# Patient Record
Sex: MALE | Race: WHITE | Employment: PART TIME | URBAN - METROPOLITAN AREA
[De-identification: names, ages, dates, MRNs, and addresses within clinical notes are randomized per-mention and may not be internally consistent; named-entity substitution may affect disease eponyms.]

---

## 2021-08-18 ENCOUNTER — APPOINTMENT (OUTPATIENT)
Dept: GENERAL RADIOLOGY | Age: 46
End: 2021-08-18

## 2021-08-18 ENCOUNTER — HOSPITAL ENCOUNTER (EMERGENCY)
Age: 46
Discharge: HOME | End: 2021-08-18

## 2021-08-18 VITALS
RESPIRATION RATE: 18 BRPM | HEART RATE: 60 BPM | DIASTOLIC BLOOD PRESSURE: 71 MMHG | TEMPERATURE: 97.6 F | SYSTOLIC BLOOD PRESSURE: 115 MMHG | OXYGEN SATURATION: 100 %

## 2021-08-18 DIAGNOSIS — S61.215A LACERATION OF LEFT RING FINGER WITHOUT FOREIGN BODY WITHOUT DAMAGE TO NAIL, INITIAL ENCOUNTER: ICD-10-CM

## 2021-08-18 DIAGNOSIS — S62.635A CLOSED DISPLACED FRACTURE OF DISTAL PHALANX OF LEFT RING FINGER, INITIAL ENCOUNTER: Primary | ICD-10-CM

## 2021-08-18 DIAGNOSIS — S67.10XA CRUSH INJURY TO FINGER, INITIAL ENCOUNTER: ICD-10-CM

## 2021-08-18 PROCEDURE — 6360000002 HC RX W HCPCS: Performed by: PHYSICIAN ASSISTANT

## 2021-08-18 PROCEDURE — 6370000000 HC RX 637 (ALT 250 FOR IP): Performed by: PHYSICIAN ASSISTANT

## 2021-08-18 PROCEDURE — 90471 IMMUNIZATION ADMIN: CPT | Performed by: PHYSICIAN ASSISTANT

## 2021-08-18 PROCEDURE — 73140 X-RAY EXAM OF FINGER(S): CPT

## 2021-08-18 PROCEDURE — 90715 TDAP VACCINE 7 YRS/> IM: CPT | Performed by: PHYSICIAN ASSISTANT

## 2021-08-18 PROCEDURE — 29130 APPL FINGER SPLINT STATIC: CPT

## 2021-08-18 PROCEDURE — 99283 EMERGENCY DEPT VISIT LOW MDM: CPT

## 2021-08-18 RX ORDER — NAPROXEN 500 MG/1
500 TABLET ORAL 2 TIMES DAILY PRN
Qty: 20 TABLET | Refills: 0 | Status: SHIPPED | OUTPATIENT
Start: 2021-08-18 | End: 2021-08-28

## 2021-08-18 RX ORDER — CEPHALEXIN 500 MG/1
500 CAPSULE ORAL 4 TIMES DAILY
Qty: 40 CAPSULE | Refills: 0 | Status: SHIPPED | OUTPATIENT
Start: 2021-08-18 | End: 2021-08-28

## 2021-08-18 RX ORDER — NAPROXEN 250 MG/1
500 TABLET ORAL ONCE
Status: COMPLETED | OUTPATIENT
Start: 2021-08-18 | End: 2021-08-18

## 2021-08-18 RX ORDER — CEPHALEXIN 250 MG/1
500 CAPSULE ORAL ONCE
Status: DISCONTINUED | OUTPATIENT
Start: 2021-08-18 | End: 2021-08-18 | Stop reason: HOSPADM

## 2021-08-18 RX ADMIN — NAPROXEN 500 MG: 250 TABLET ORAL at 13:05

## 2021-08-18 RX ADMIN — TETANUS TOXOID, REDUCED DIPHTHERIA TOXOID AND ACELLULAR PERTUSSIS VACCINE, ADSORBED 0.5 ML: 5; 2.5; 8; 8; 2.5 SUSPENSION INTRAMUSCULAR at 13:05

## 2021-08-18 ASSESSMENT — PAIN SCALES - GENERAL
PAINLEVEL_OUTOF10: 6
PAINLEVEL_OUTOF10: 6

## 2021-08-18 ASSESSMENT — ENCOUNTER SYMPTOMS
DIARRHEA: 0
NAUSEA: 0
COLOR CHANGE: 1
ABDOMINAL PAIN: 0
SHORTNESS OF BREATH: 0
CHEST TIGHTNESS: 0
VOMITING: 0

## 2021-08-18 NOTE — ED PROVIDER NOTES
[facility]        Pt Name: Prashanth John  MRN: [id number]  Armsgeron[place]ogfn[place] 1975  Date of evaluation: 8/18/2021  Provider: Terrence Cruz PA-C  PCP: No primary care provider on file. Note Started: 12:58 PM EDT       CARINE. I have evaluated this patient. My supervising physician was available for consultation. CHIEF COMPLAINT       Chief Complaint   Patient presents with    Finger Injury     Patient  in with complaints of finger injury that occured yesterday. patient states that he smashed his finger. bleeding controlled. HISTORY OF PRESENT ILLNESS   (Location, Timing/Onset, Context/Setting, Quality, Duration, Modifying Factors, Severity, Associated Signs and Symptoms)  Note limiting factors. Chief Complaint: Left ring finger injury    Prashanth John is a 39 y.o. male who presents to the emerge department difficulty as a pertains to the left finger injury. Patient reports that he was at the gym yesterday when he had the injury accident. He sustained a crush injury over the distal phalanx where the fingertip got pinched and actually caused a laceration. Patient is been tending to it in the home environment with Polysporin. He states he is having increasing levels of pain and discomfort this morning with this. Current pain rates to be 6 out of 10. He has no carly dysesthesias. He is done nothing yet for the pain and discomfort. He is unsure when his tetanus vaccination was and in reviewing the chart it does not currently up-to-date. He states that this is an isolated complaint to the left ring finger with no additional complaints or concerns and with this he presents the ED for evaluation and treatment. Nursing Notes were all reviewed and agreed with or any disagreements were addressed in the HPI.     REVIEW OF SYSTEMS    (2-9 systems for level 4, 10 or more for level 5)     Review of Systems   Constitutional: Negative for activity change, chills and fever. Respiratory: Negative for chest tightness and shortness of breath. Cardiovascular: Negative for chest pain. Gastrointestinal: Negative for abdominal pain, diarrhea, nausea and vomiting. Genitourinary: Negative for dysuria and flank pain. Musculoskeletal: Positive for arthralgias. Skin: Positive for color change and wound. All other systems reviewed and are negative. Positives and Pertinent negatives as per HPI. Except as noted above in the ROS, all other systems were reviewed and negative. PAST MEDICAL HISTORY   History reviewed. No pertinent past medical history. SURGICAL HISTORY   History reviewed. No pertinent surgical history. CURRENTMEDICATIONS       Previous Medications    No medications on file         ALLERGIES     Patient has no known allergies. FAMILYHISTORY     History reviewed. No pertinent family history. SOCIAL HISTORY       Social History     Tobacco Use    Smoking status: Current Some Day Smoker    Smokeless tobacco: Never Used   Substance Use Topics    Alcohol use: Yes    Drug use: Never       SCREENINGS             PHYSICAL EXAM    (up to 7 for level 4, 8 or more for level 5)     ED Triage Vitals   BP Temp Temp Source Pulse Resp SpO2 Height Weight   08/18/21 1235 08/18/21 1234 08/18/21 1234 08/18/21 1234 08/18/21 1234 08/18/21 1234 -- --   115/71 97.6 °F (36.4 °C) Oral 60 18 100 %         Physical Exam  Vitals and nursing note reviewed. Constitutional:       General: He is awake. He is not in acute distress. Appearance: Normal appearance. He is well-developed. He is not ill-appearing or diaphoretic. HENT:      Head: Normocephalic and atraumatic. No raccoon eyes, Thibodeaux's sign or laceration. Right Ear: Hearing and external ear normal.      Left Ear: Hearing and external ear normal.      Nose: Nose normal.   Eyes:      General: No scleral icterus. Right eye: No discharge.          Left eye: No discharge. Conjunctiva/sclera: Conjunctivae normal.   Neck:      Vascular: No JVD. Cardiovascular:      Rate and Rhythm: Normal rate and regular rhythm. Heart sounds: No murmur heard. No friction rub. No gallop. Pulmonary:      Effort: Pulmonary effort is normal. No accessory muscle usage or respiratory distress. Breath sounds: Normal breath sounds. No wheezing, rhonchi or rales. Musculoskeletal:      Cervical back: Normal range of motion. Comments: 0.75 cm open laceration without bleeding. Skin edges closely yet not perfectly approximated. Skin:     General: Skin is warm and dry. Neurological:      Mental Status: He is alert and oriented to person, place, and time. GCS: GCS eye subscore is 4. GCS verbal subscore is 5. GCS motor subscore is 6. Cranial Nerves: No cranial nerve deficit. Sensory: No sensory deficit. Coordination: Coordination normal.   Psychiatric:         Behavior: Behavior normal. Behavior is cooperative. DIAGNOSTIC RESULTS   LABS:    Labs Reviewed - No data to display    When ordered only abnormal lab results are displayed. All other labs were within normal range or not returned as of this dictation. EKG: When ordered, EKG's are interpreted by the Emergency Department Physician in the absence of a cardiologist.  Please see their note for interpretation of EKG. RADIOLOGY:   Non-plain film images such as CT, Ultrasound and MRI are read by the radiologist. Plain radiographic images are visualized and preliminarily interpreted by the ED Provider with the below findings:        Interpretation per the Radiologist below, if available at the time of this note:    XR FINGER LEFT (MIN 2 VIEWS)   Final Result   Small nondisplaced fracture at the distal tip of the 4th distal phalanx.              PROCEDURES   Unless otherwise noted below, none     Ortho Injury    Date/Time: 8/18/2021 1:40 PM  Performed by: SANDER Cabral by: Mt Rey PA-C   Consent: Verbal consent obtained. Consent given by: patient  Injury location: finger  Location details: left ring finger  Injury type: fracture  Fracture type: distal phalanx  MCP joint involved: no  IP joint involved: no  Pre-procedure neurovascular assessment: neurovascularly intact  Pre-procedure distal perfusion: normal  Pre-procedure neurological function: normal  Pre-procedure range of motion: reduced  Immobilization: splint  Post-procedure neurovascular assessment: post-procedure neurovascularly intact  Post-procedure distal perfusion: normal  Post-procedure neurological function: normal  Post-procedure range of motion: unchanged  Comments: Here in emergency department today was able to provide the patient with definitive fracture care by reviewing the results and arranging follow-up as needed with orthopedics on an outpatient basis with medication for pain control. CRITICAL CARE TIME   N/A    CONSULTS:  None      EMERGENCY DEPARTMENT COURSE and DIFFERENTIAL DIAGNOSIS/MDM:   Vitals:    Vitals:    08/18/21 1234 08/18/21 1235   BP:  115/71   Pulse: 60    Resp: 18    Temp: 97.6 °F (36.4 °C)    TempSrc: Oral    SpO2: 100%        Patient was given the following medications:  Medications   cephALEXin (KEFLEX) capsule 500 mg (has no administration in time range)   naproxen (NAPROSYN) tablet 500 mg (500 mg Oral Given 8/18/21 1305)   Tetanus-Diphth-Acell Pertussis (BOOSTRIX) injection 0.5 mL (0.5 mLs Intramuscular Given 8/18/21 1305)           The patient's detailed history of present illness is documented as above. Upon arrival to the emergency department the patient's vital signs are as documented. The patient is noted to be hemodynamically stable and afebrile. Physical examination findings are as above. Symptomatology was treated as above the patient's tetanus vaccination was updated today.   Radiographs of the ring finger demonstrate a small nondisplaced fracture of the distal tip of the fourth distal phalanx which clinically correlates to the patient's crush injury. My initial care plan for this patient was to splint him and provide antibiotics given work. Follow-up it appears that he did leave the emergency department has not been made aware of the test results. Attempt to call a telephone numbers on the chart of been unsuccessful as well. With that being said the patient did balance of his treatment. A message for the patient informing him of the need to return to the emergency department to  his prescriptions as well as to be splinted. These were left in the rapid assessment zone and if the patient returns will be dispensed. See additional nursing notes if that should occur. FINAL IMPRESSION      1. Closed displaced fracture of distal phalanx of left ring finger, initial encounter    2. Crush injury to finger, initial encounter    3.  Laceration of left ring finger without foreign body without damage to nail, initial encounter          DISPOSITION/PLAN   DISPOSITION: Left without completion of treatment      PATIENT REFERRED TO:  Annamarie Dalton MD  [facility] [address] [address]  [phone]    Schedule an appointment as soon as possible for a visit       [facility] Emergency Department  [address] [place] [address]  [phone]    If symptoms worsen      DISCHARGE MEDICATIONS:  New Prescriptions    CEPHALEXIN (KEFLEX) 500 MG CAPSULE    Take 1 capsule by mouth 4 times daily for 10 days    NAPROXEN (NAPROSYN) 500 MG TABLET    Take 1 tablet by mouth 2 times daily as needed for Pain       DISCONTINUED MEDICATIONS:  Discontinued Medications    No medications on file              (Please note that portions of this note were completed with a voice recognition program.  Efforts were made to edit the dictations but occasionally words are mis-transcribed.)    Estella Newsome PA-C (electronically signed)           Antonio Marquez SANDER  08/18/21 1409